# Patient Record
Sex: MALE | Race: WHITE | NOT HISPANIC OR LATINO | URBAN - METROPOLITAN AREA
[De-identification: names, ages, dates, MRNs, and addresses within clinical notes are randomized per-mention and may not be internally consistent; named-entity substitution may affect disease eponyms.]

---

## 2020-07-25 ENCOUNTER — TELEPHONE ENCOUNTER (OUTPATIENT)
Dept: URBAN - METROPOLITAN AREA CLINIC 13 | Facility: CLINIC | Age: 35
End: 2020-07-25

## 2020-07-25 RX ORDER — SODIUM SULFATE, POTASSIUM SULFATE, MAGNESIUM SULFATE 17.5; 3.13; 1.6 G/ML; G/ML; G/ML
5PM THE DAY BEFORE PROCEDURE, DRINK 1/2 OF PREP, THEN 6HOURS BEFORE PROCEDURE DRINK REMAINDER OF PREP SOLUTION, CONCENTRATE ORAL
Qty: 1 | Refills: 0 | OUTPATIENT
Start: 2019-12-26 | End: 2020-01-14

## 2020-07-25 RX ORDER — PREDNISONE 50 MG/1
TAKE 1 TABLET DAILY AS DIRECTED TABLET ORAL
Refills: 0 | OUTPATIENT
Start: 2010-09-02 | End: 2010-12-09

## 2020-07-26 ENCOUNTER — TELEPHONE ENCOUNTER (OUTPATIENT)
Dept: URBAN - METROPOLITAN AREA CLINIC 13 | Facility: CLINIC | Age: 35
End: 2020-07-26

## 2020-07-26 RX ORDER — MESALAMINE 4 G/60ML
USE 1 ENEMA DAILY ENEMA RECTAL
Qty: 4 | Refills: 5 | Status: ACTIVE | COMMUNITY
Start: 2019-03-21

## 2020-10-26 ENCOUNTER — TELEPHONE ENCOUNTER (OUTPATIENT)
Dept: URBAN - METROPOLITAN AREA CLINIC 113 | Facility: CLINIC | Age: 35
End: 2020-10-26

## 2020-10-26 ENCOUNTER — LAB OUTSIDE AN ENCOUNTER (OUTPATIENT)
Dept: URBAN - METROPOLITAN AREA CLINIC 113 | Facility: CLINIC | Age: 35
End: 2020-10-26

## 2020-10-26 RX ORDER — SODIUM, POTASSIUM,MAG SULFATES 17.5-3.13G
354ML SOLUTION, RECONSTITUTED, ORAL ORAL
Qty: 354 MILLILITER | Refills: 0 | OUTPATIENT
Start: 2020-10-26 | End: 2020-10-27

## 2020-12-08 ENCOUNTER — TELEPHONE ENCOUNTER (OUTPATIENT)
Dept: URBAN - METROPOLITAN AREA CLINIC 113 | Facility: CLINIC | Age: 35
End: 2020-12-08

## 2020-12-08 ENCOUNTER — ERX REFILL RESPONSE (OUTPATIENT)
Age: 35
End: 2020-12-08

## 2020-12-08 RX ORDER — MESALAMINE 1.2 G/1
TAKE 4 TABLETS BY MOUTH ONCE DAILY TABLET, DELAYED RELEASE ORAL
Qty: 120 | Refills: 0

## 2020-12-08 RX ORDER — MESALAMINE 4 G/60ML
USE 1 ENEMA DAILY ENEMA RECTAL ONCE A DAY
Qty: 30 | Refills: 5
Start: 2019-03-21 | End: 2019-09-17

## 2020-12-08 RX ORDER — MESALAMINE 4 G/60ML
USE 1 ENEMA DAILY ENEMA RECTAL ONCE A DAY
Qty: 30 | Refills: 5
Start: 2019-03-21

## 2020-12-11 ENCOUNTER — OFFICE VISIT (OUTPATIENT)
Dept: URBAN - METROPOLITAN AREA SURGERY CENTER 25 | Facility: SURGERY CENTER | Age: 35
End: 2020-12-11
Payer: COMMERCIAL

## 2020-12-11 ENCOUNTER — CLAIMS CREATED FROM THE CLAIM WINDOW (OUTPATIENT)
Dept: URBAN - METROPOLITAN AREA CLINIC 4 | Facility: CLINIC | Age: 35
End: 2020-12-11
Payer: COMMERCIAL

## 2020-12-11 DIAGNOSIS — K51.80 OTHER ULCERATIVE COLITIS WITHOUT COMPLICATIONS: ICD-10-CM

## 2020-12-11 DIAGNOSIS — K51.919 ULCERATIVE COLITIS, UNSPECIFIED WITH UNSPECIFIED COMPLICATIONS: ICD-10-CM

## 2020-12-11 DIAGNOSIS — K51.50 CHRONIC LEFT-SIDED ULCERATIVE COLITIS: ICD-10-CM

## 2020-12-11 PROCEDURE — G8907 PT DOC NO EVENTS ON DISCHARG: HCPCS | Performed by: INTERNAL MEDICINE

## 2020-12-11 PROCEDURE — 45380 COLONOSCOPY AND BIOPSY: CPT | Performed by: INTERNAL MEDICINE

## 2020-12-11 PROCEDURE — 88305 TISSUE EXAM BY PATHOLOGIST: CPT | Performed by: PATHOLOGY

## 2020-12-11 PROCEDURE — G9937 DIG OR SURV COLSCO: HCPCS | Performed by: INTERNAL MEDICINE

## 2020-12-11 PROCEDURE — 88342 IMHCHEM/IMCYTCHM 1ST ANTB: CPT | Performed by: PATHOLOGY

## 2020-12-11 RX ORDER — PREDNISONE 10 MG/1
2 TABLETS WITH FOOD OR MILK TABLET ORAL ONCE A DAY
Qty: 60 | Refills: 2 | OUTPATIENT
Start: 2020-12-11 | End: 2021-03-11

## 2020-12-11 RX ORDER — MESALAMINE 1.2 G/1
TAKE 4 TABLETS BY MOUTH ONCE DAILY TABLET, DELAYED RELEASE ORAL
Qty: 120 | Refills: 0 | Status: ACTIVE | COMMUNITY

## 2020-12-11 RX ORDER — MESALAMINE 4 G/60ML
USE 1 ENEMA DAILY ENEMA RECTAL ONCE A DAY
Qty: 30 | Refills: 5 | Status: ACTIVE | COMMUNITY
Start: 2019-03-21

## 2020-12-12 ENCOUNTER — WEB ENCOUNTER (OUTPATIENT)
Dept: URBAN - METROPOLITAN AREA CLINIC 13 | Facility: CLINIC | Age: 35
End: 2020-12-12

## 2020-12-12 RX ORDER — PREDNISONE 10 MG/1
2 TABLETS WITH FOOD OR MILK TABLET ORAL ONCE A DAY
Qty: 60 | Refills: 2
Start: 2020-12-11

## 2020-12-13 ENCOUNTER — TELEPHONE ENCOUNTER (OUTPATIENT)
Dept: URBAN - METROPOLITAN AREA CLINIC 113 | Facility: CLINIC | Age: 35
End: 2020-12-13

## 2020-12-17 ENCOUNTER — TELEPHONE ENCOUNTER (OUTPATIENT)
Dept: URBAN - METROPOLITAN AREA CLINIC 113 | Facility: CLINIC | Age: 35
End: 2020-12-17

## 2020-12-24 ENCOUNTER — ERX REFILL RESPONSE (OUTPATIENT)
Age: 35
End: 2020-12-24

## 2020-12-24 RX ORDER — MESALAMINE 1.2 G/1
TAKE 4 TABLETS BY MOUTH ONCE DAILY TABLET, DELAYED RELEASE ORAL
Qty: 120 | Refills: 0

## 2021-01-19 ENCOUNTER — WEB ENCOUNTER (OUTPATIENT)
Dept: URBAN - METROPOLITAN AREA CLINIC 113 | Facility: CLINIC | Age: 36
End: 2021-01-19

## 2021-01-19 ENCOUNTER — OFFICE VISIT (OUTPATIENT)
Dept: URBAN - METROPOLITAN AREA CLINIC 113 | Facility: CLINIC | Age: 36
End: 2021-01-19
Payer: COMMERCIAL

## 2021-01-19 VITALS
SYSTOLIC BLOOD PRESSURE: 127 MMHG | HEART RATE: 79 BPM | BODY MASS INDEX: 24.88 KG/M2 | HEIGHT: 69 IN | DIASTOLIC BLOOD PRESSURE: 87 MMHG | RESPIRATION RATE: 20 BRPM | WEIGHT: 168 LBS | TEMPERATURE: 99.6 F

## 2021-01-19 DIAGNOSIS — K51.90 CHRONIC ULCERATIVE COLITIS, WITHOUT COMPLICATIONS: ICD-10-CM

## 2021-01-19 PROCEDURE — G8482 FLU IMMUNIZE ORDER/ADMIN: HCPCS | Performed by: NURSE PRACTITIONER

## 2021-01-19 PROCEDURE — G8427 DOCREV CUR MEDS BY ELIG CLIN: HCPCS | Performed by: NURSE PRACTITIONER

## 2021-01-19 PROCEDURE — 99214 OFFICE O/P EST MOD 30 MIN: CPT | Performed by: NURSE PRACTITIONER

## 2021-01-19 PROCEDURE — G9903 PT SCRN TBCO ID AS NON USER: HCPCS | Performed by: NURSE PRACTITIONER

## 2021-01-19 RX ORDER — PREDNISONE 10 MG/1
3 1/2 TABLETS WITH FOOD OR MILK TABLET ORAL ONCE A DAY
Refills: 2 | Status: ACTIVE | COMMUNITY
Start: 2020-12-11 | End: 2021-03-11

## 2021-01-19 RX ORDER — MESALAMINE 4 G/60ML
USE 1 ENEMA DAILY ENEMA RECTAL ONCE A DAY
Qty: 30 | Refills: 5 | Status: ON HOLD | COMMUNITY
Start: 2019-03-21

## 2021-01-19 RX ORDER — MESALAMINE 1.2 G/1
TAKE 4 TABLETS BY MOUTH ONCE DAILY TABLET, DELAYED RELEASE ORAL
Qty: 120 | Refills: 0 | Status: ON HOLD | COMMUNITY

## 2021-01-19 RX ORDER — PREDNISONE 5 MG/1
5 TABLETS DAILY FOR 7 DAYS; 4 DAILY FOR 7 DAYS; 3 DAILY FOR 7 DAYS; 2 DAILY FOR 7 DAYS; 1 DAILY FOR 7 DAYS; DC TABLET ORAL ONCE A DAY
Qty: 105 | Refills: 0 | OUTPATIENT

## 2021-01-19 NOTE — HPI-OTHER HISTORIES
Colonoscopy 12/11/2020: BBPS 9, moderate grade 1 nonbleeding internal hemorrhoids, left-sided colitis.  Inflammation was found from the anus to the transverse colon.  This was graded as Zavala score 2 (moderate disease), worsened compared to previous examination status post biopsy.  Cecum, ascending, hepatic flexure biopsy demonstrated patchy chronic active colitis, consistent with partially treated ulcerative colitis.  Splenic flexure, descending, sigmoid biopsy demonstrated diffuse chronic active colitis, consistent with ulcerative colitis.  There were no infectious organisms, dysplasia, or malignancy in any specimens.

## 2021-01-19 NOTE — HPI-TODAY'S VISIT:
This is a 35-year-old male with a history of pan ulcerative colitis (diagnosed 2010) presenting for follow-up after surveillance colonoscopy.    He was last seen 1/14/2020.  He was doing well on mesalamine 4.8 g daily.  He had recently required mesalamine enemas for 3 or 4 days because of increased diarrhea.  His symptoms had resolved.  He was instructed to continue his current medical regimen including using mesalamine enemas as needed.    For years, he has been taking oral and topical mesalamine when he experiences symptoms of a flare.  He typically requires daily medication for a week or 2 once or twice a year.  2 years ago, he had been 5 years off medication and without symptoms.  A month or 2 ago, he began to experience symptoms of a flare reporting diarrhea, abdominal pain, blood in his stools, and urgency to defecate.  He started taking mesalamine and symptoms persisted.  He was prescribed prednisone 10 mg daily in late December.  This wasn't effective.  A week later, it was increased to 40 mg per day.  He began to experience symptoms of heart racing.  He decrease by 5 mg every other day.  He had a brief flare of symptoms.  Currently, he has been tapering prednisone from 40 mg by 5 mg every week.  He is currently taking 35 mg per day.  He is not taking oral mesalamine or using topical mesalamine.  He is having 2 nonurgent bowel movements per day.  There is been no blood in his stool or pain.  He denies any other abdominal symptoms.  He has been experiencing elevated heart rate, blood pressure, and a feeling of being flushed.  He is interested in tapering from prednisone.  He is interested in discussing the possibility of a colectomy.

## 2021-01-20 PROBLEM — 64766004 ULCERATIVE COLITIS: Status: ACTIVE | Noted: 2020-10-26

## 2021-04-21 ENCOUNTER — DASHBOARD ENCOUNTERS (OUTPATIENT)
Age: 36
End: 2021-04-21

## 2021-04-21 ENCOUNTER — OFFICE VISIT (OUTPATIENT)
Dept: URBAN - METROPOLITAN AREA CLINIC 107 | Facility: CLINIC | Age: 36
End: 2021-04-21
Payer: COMMERCIAL

## 2021-04-21 VITALS
RESPIRATION RATE: 18 BRPM | SYSTOLIC BLOOD PRESSURE: 115 MMHG | BODY MASS INDEX: 23.7 KG/M2 | HEIGHT: 69 IN | WEIGHT: 160 LBS | HEART RATE: 51 BPM | DIASTOLIC BLOOD PRESSURE: 91 MMHG | TEMPERATURE: 98.3 F

## 2021-04-21 DIAGNOSIS — K51.90 CHRONIC ULCERATIVE COLITIS, WITHOUT COMPLICATIONS: ICD-10-CM

## 2021-04-21 PROCEDURE — 99214 OFFICE O/P EST MOD 30 MIN: CPT | Performed by: INTERNAL MEDICINE

## 2021-04-21 RX ORDER — MESALAMINE 4 G/60ML
USE 1 ENEMA DAILY ENEMA RECTAL ONCE A DAY
Qty: 30 | Refills: 5 | Status: ON HOLD | COMMUNITY
Start: 2019-03-21

## 2021-04-21 RX ORDER — PREDNISONE 5 MG/1
5 TABLETS DAILY FOR 7 DAYS; 4 DAILY FOR 7 DAYS; 3 DAILY FOR 7 DAYS; 2 DAILY FOR 7 DAYS; 1 DAILY FOR 7 DAYS; DC TABLET ORAL ONCE A DAY
Qty: 105 | Refills: 0 | Status: DISCONTINUED | COMMUNITY

## 2021-04-21 RX ORDER — MESALAMINE 1.2 G/1
TAKE 4 TABLETS BY MOUTH ONCE DAILY TABLET, DELAYED RELEASE ORAL
Qty: 120 | Refills: 0 | Status: ACTIVE | COMMUNITY

## 2021-04-21 NOTE — HPI-TODAY'S VISIT:
This is a 35-year-old male with a history of pan ulcerative colitis (diagnosed 2010) presenting for follow-up.   When he had previously been seen on 1/14/2020, he was doing well on mesalamine 4.8 g daily. He was instructed to continue his current medical regimen including using mesalamine enemas as needed.  He was last seen here on January 19, 2021 because of a flare which occurred after a surveillance colonoscopy.  Colonoscopy 12/11/2020: BBPS 9, moderate grade 1 nonbleeding internal hemorrhoids, left-sided colitis.  Inflammation was found from the anus to the transverse colon.  This was graded as Zavala score 2 (moderate disease), worsened compared to previous examination status post biopsy.  Cecum, ascending, hepatic flexure biopsy demonstrated patchy chronic active colitis, consistent with partially treated ulcerative colitis.  Splenic flexure, descending, sigmoid biopsy demonstrated diffuse chronic active colitis, consistent with ulcerative colitis.  There were no infectious organisms, dysplasia, or malignancy in any specimens.   For years, he has been taking oral and topical mesalamine when he experiences symptoms of a flare.  He typically requires daily medication for a week or 2 once or twice a year.  Two years ago, he had been 5 years off medication and without symptoms.  In late November 2020, he began to experience symptoms of a flare reporting diarrhea, abdominal pain, blood in his stools, and urgency to defecate.  He started taking mesalamine and symptoms persisted.  He was prescribed prednisone 10 mg daily in late December.  This wasn't effective.  A week later, it was increased to 40 mg per day.  He began to experience symptoms of heart racing. He gradually tapered his prednisone after that.  At the time of his last office visit in January, he was taking 35 mg per day.  He was not taking oral mesalamine or using topical mesalamine and was having 2 nonurgent bowel movements per day.  There was no blood in his stool or abdominal pain.  He denied any other abdominal symptoms.  He has been experiencing elevated heart rate, blood pressure, and a feeling of being flushed while on steroids.  He expressed an interest in discussing the possibility of a colectomy With a surgeon and saw Dr. Bautista Fitzpatrick in consultation at our request.  The patient is now tapered off her prednisone and is back on Lialda 4.8 g daily.  He is not using the enemas.  He is completely asymptomatic at present.  He denies rectal bleeding, abdominal pain, nausea, vomiting, etc.  He has noticed that his symptoms tend to exacerbate when he is under stress.  After discussion of the pros and cons of colectomy with Dr. Fitzpatrick, he has elected to defer consideration of this for now.  However, he would be receptive to possibly undergoing this surgery in the future.

## 2021-07-23 ENCOUNTER — ERX REFILL RESPONSE (OUTPATIENT)
Dept: URBAN - METROPOLITAN AREA CLINIC 113 | Facility: CLINIC | Age: 36
End: 2021-07-23

## 2021-07-23 RX ORDER — MESALAMINE 1.2 G/1
TAKE 4 TABLETS BY MOUTH ONCE DAILY TABLET, DELAYED RELEASE ORAL
Qty: 120 TABLET | Refills: 1 | OUTPATIENT

## 2021-07-23 RX ORDER — MESALAMINE 1.2 G/1
TAKE 4 TABLETS BY MOUTH ONCE DAILY TABLET, DELAYED RELEASE ORAL
Qty: 120 | Refills: 0 | OUTPATIENT